# Patient Record
Sex: FEMALE | Race: WHITE | NOT HISPANIC OR LATINO | URBAN - METROPOLITAN AREA
[De-identification: names, ages, dates, MRNs, and addresses within clinical notes are randomized per-mention and may not be internally consistent; named-entity substitution may affect disease eponyms.]

---

## 2017-08-08 ENCOUNTER — OFFICE VISIT - RIVER FALLS (OUTPATIENT)
Dept: FAMILY MEDICINE | Facility: CLINIC | Age: 57
End: 2017-08-08

## 2017-08-08 ASSESSMENT — MIFFLIN-ST. JEOR: SCORE: 1166.23

## 2017-08-11 ENCOUNTER — OFFICE VISIT - RIVER FALLS (OUTPATIENT)
Dept: FAMILY MEDICINE | Facility: CLINIC | Age: 57
End: 2017-08-11

## 2022-02-12 VITALS
DIASTOLIC BLOOD PRESSURE: 84 MMHG | HEIGHT: 63 IN | SYSTOLIC BLOOD PRESSURE: 148 MMHG | SYSTOLIC BLOOD PRESSURE: 150 MMHG | HEART RATE: 96 BPM | WEIGHT: 140 LBS | BODY MASS INDEX: 25.2 KG/M2 | DIASTOLIC BLOOD PRESSURE: 82 MMHG | BODY MASS INDEX: 24.8 KG/M2 | HEIGHT: 63 IN | HEART RATE: 102 BPM | TEMPERATURE: 99.2 F

## 2022-02-15 NOTE — PROGRESS NOTES
Patient:   REBEKA FLOWER            MRN: [id number]            FIN: [id number]               Age:   57 years     Sex:  Female     :  1960   Associated Diagnoses:   Postmenopausal bleeding   Author:   Justin Eaton MD      Visit Information      Date of Service: 2017 09:13 am  Performing Location: [facility]  Encounter#: [id number]      Primary Care Provider (PCP):  Consuelo Pink MD    NPI# [id number]      Referring Provider:  Justin Eaton MD    NPI# [id number]      Chief Complaint   2017 9:23 AM CDT    U/S per Merlissa- PMB 2 weeks ago      Interval History   The patient is a 57-year-old postmenopausal female in today for vaginal probe ultrasound.  She is referred by CHICO Baptiste.  The patient was in concerned about a bleeding episode and was due for a Pap smear and mentioned that she bled two weeks prior to her visit.  The patient has not bled since that bleeding episode a few weeks ago.  She did have some cramps at about the time she bled.  Otherwise the cramps have resolved and she is asymptomatic.  Her menopause is uncomplicated.  She has not had a history of abnormal menses that she reports.      Review of Systems   Review  of systems is negative except as documented under interval history.      Health Status   Allergies:    Allergic Reactions (Selected)  No known allergies   Medications:  (Selected)   Documented Medications  Documented  Fish Oil: po, 0 Refill(s), Type: Maintenance  turmeric: 0 Refill(s), Type: Maintenance   Problem list:    All Problems  Tobacco user / ICD-9-.1 / Probable  Tobacco abuse / ICD-9-.1 / Confirmed  Had a seizure 10 years ago and normal EEG / Confirmed  SVT (supraventricular tachycardia) / SNOMED CT [id number] / Confirmed  Inactive: Seizure disorder / SNOMED CT 746866311  Inactive: Bronchial pneumonia / SNOMED CT 3184877528  Inactive: CTS (carpal tunnel syndrome) / SNOMED CT 25825757  Resolved:  Chicken pox / SNOMED CT 23302701  Resolved: Pregnancy / SNOMED CT 962528644  Resolved: Pregnancy / SNOMED CT 248119179  Resolved: Pregnancy / SNOMED CT 582401691  Resolved: Pregnancy / SNOMED CT 926790716      Histories   Past Medical History:    Active  Tobacco abuse (305.1)  Had a seizure 10 years ago and normal EEG  SVT (supraventricular tachycardia) ([id number])  Resolved  Chicken pox (57437188):  Resolved.  Pregnancy (590823242):  Resolved on 1980 at 19 years.  Pregnancy (013951614):  Resolved on 1981 at 21 years.  Pregnancy (275526309):  Resolved on 10/29/1982 at 22 years.  Pregnancy (731646289):  Resolved in  at 23 years.   Family History:    Diabetes mellitus  Father  CA - Lung cancer  Mother ()  Stroke  Grandfather (P)  Grandmother (P)  CA - Cancer of prostate  Father     Procedure history:    Colonoscopy (588172586) on 10/29/2008 at 48 Years.  Comments:  2013 12:29 PM - Violette Rogel  Findings: Both polyps were hyperplastic. Recommended repeat colonoscopy in 2018.    2013 12:19 PM - Violette Rogel  With polypectomy.  Corbin Andrade MD, [facility].  Tubal ligation (223952438) in  at 27 Years.  Excision of ganglion (205946895) on 1986 at 25 Years.  Comments:  2013 12:55 PM - Violette Rogel  Right dorsal wrist  Childbirth (2349858488) on 1984 at 24 Years.  Comments:  2013 1:07 PM - Violette Rogel    Childbirth (8214247638) on 10/29/1982 at 22 Years.  Comments:  2013 1:07 PM - Violette Rogel  IUD - Removal of intrauterine device (469601015) in  at 22 Years.  Comments:  2013 12:42 PM - Violette Rogel  Lippes IUD  Childbirth ([id number]) on 1981 at 21 Years.  Comments:  2013 1:06 PM - Violette Rogel    Laparotomy ([id number]) in the month of 1980 at 20 Years.  Comments:  2013 12:50 PM - Violette Rogel  Exploratory, removal of IUD.  Dr. Levy.  Childbirth (6896350383) on 1980 at 19  Years.  Comments:  2013 1:06 PM - Violette Rogel    Osteotomy (657176041) on 1977 at 16 Years.  Comments:  2013 12:36 PM - Violette Rogel  Bilateral Kelikian/ first metatarsal. Dr. Cabrera.  Bunionectomy ([id number]) in  at 16 Years.  Comments:  2013 12:23 PM - Violette Rogel  Bilateral   Social History:        Alcohol Assessment            Current, Beer (12 oz), 1-3 times/week, 3 drinks/episode average.  5 drinks/episode maximum.      Tobacco Assessment            Current every day smoker, 15 per day.  20 year(s).      Substance Abuse Assessment            Never      Employment and Education Assessment            Employed, Work/School description: [employer].      Home and Environment Assessment            Marital status: .  Spouse/Partner name: Ed Frame.  Risks in environment: Unlocked guns, Does not wear               helmet, Smoke/CO detectors absent.      Nutrition and Health Assessment            Type of diet: Regular.      Exercise and Physical Activity Assessment            Exercise frequency: Never.      Sexual Assessment            Sexually active: Yes.  Sexual orientation: Heterosexual.        Physical Examination   Vital Signs   2017 9:23 AM CDT Temperature Tympanic 99.2 DegF    Peripheral Pulse Rate 102 bpm  HI    Pulse Site Radial artery    HR Method Manual    Systolic Blood Pressure 148 mmHg  HI    Diastolic Blood Pressure 84 mmHg    Mean Arterial Pressure 105 mmHg    BP Site Right arm    BP Method Manual      Gynecologic:  Ultrasound is done..       Review / Management   Radiology results   Ultrasound, Complete pelvis vaginal probe ultrasound is done.  Vaginal probe ultrasound finds the uterus 6 x 4 x 3 cm.  The endometrium is very thin with no endometrial abnormalities.  Myometrium is generally normal.  On the left side there is an area of slight increased echolucency with some sharper echoes in the middle consistent with a small fibroid 8 mm across adjacent to the  endometrium.  It is very benign in appearance.  No other abnormalities are noted.  There is no fluid in the cul-de-sac.  The right ovary is 14 mm and normal in appearance.  The left ovary could not be seen with certainty.  There are no other masses or abnormalities noted in the pelvis.      Impression and Plan   Diagnosis     Postmenopausal bleeding ([id number]-FV N95.0).     No evidence of endometrial pathology.    Tiny fibroid possibly degenerating as etiology for bleeding but no evidence of ongoing pathology..     Plan:  The patient was reassured.  She will let us know if she has continuing symptoms..    Patient Instructions:       Counseled: Patient, Regarding diagnosis, Regarding treatment, Verbalized understanding.

## 2022-02-15 NOTE — PROCEDURES
Accession Number:       [id number]  CLINICAL INFORMATION::     None given  LMP::     POSTMENOPAUSAL  PREV. PAP::     2013  PREV. BX::     NONE GIVEN  SOURCE::     Cervix  STATEMENT OF ADEQUACY::     Satisfactory for evaluation. Endocervical/transformation zone component absent.  INTERPRETATION/RESULT::     Negative for intraepithelial lesion or malignancy.  COMMENT::     This Pap test has been evaluated with computer assisted technology.  CYTOTECHNOLOGIST::     BARRIE ARIAS(ASCP) CT Screening location: [place] [address]

## 2022-02-15 NOTE — PROGRESS NOTES
Patient:   REBEKA FLOWER            MRN: [id number]            FIN: [id number]               Age:   57 years     Sex:  Female     :  1960   Associated Diagnoses:   Annual physical exam; Need for TD vaccine   Author:   Kusum Dobson      Visit Information      Date of Service: 2017 02:25 pm  Performing Location: [facility]  Encounter#: [id number]      Chief Complaint   2017 2:28 PM CDT     Patient is here for annual exam.  Had 1 episode of  vaginal bleeding 2 weeks ago.      Well Adult History   Chief complaint reviewed and confirmed with patient.    The patient is here today for routine health maintenance visit.  Pap smear is not current.  Mammograph is not up to date.  She is a smoker and interested in quitting.  Colonoscopy is current.  Glucose and cholesterol screening is not current.  Patient will return fasting for lab work.  Tetanus is due and will receive today.  Her only concern today is she had an episode of bright red bleeding two weeks ago that has not continued.  This has never occurred before.  She is postmenopausal by six plus years.           Review of Systems   Constitutional:  Negative.    Eye:  Negative.    Ear/Nose/Mouth/Throat:  Negative.    Respiratory:  Negative.    Cardiovascular:  Negative.    Breast:  Negative.    Gastrointestinal:  Negative.    Genitourinary:  Negative.    Gynecologic:  Negative except as documented in history of present illness.    Hematology/Lymphatics:  Negative.    Endocrine:  Negative.    Immunologic:  Negative.    Musculoskeletal:  Negative.    Integumentary:  Negative.    Neurologic:  Negative.    Psychiatric:  Negative.    All other systems reviewed and negative      Health Status   Allergies:    Allergic Reactions (Selected)  No known allergies   Medications:  (Selected)   Documented Medications  Documented  Fish Oil: po, 0 Refill(s), Type: Maintenance  turmeric: 0 Refill(s), Type: Maintenance   Problem list:    All Problems  (Selected)  Had a seizure 10 years ago and normal EEG / Confirmed  SVT (supraventricular tachycardia) / SNOMED CT [id number] / Confirmed  Tobacco abuse / ICD-9-.1 / Confirmed  Tobacco user / ICD-9-.1 / Probable      Histories   Past Medical History:    Active  Tobacco abuse (305.1)  Had a seizure 10 years ago and normal EEG  SVT (supraventricular tachycardia) ([id number])  Resolved  Chicken Pox (052.9):  Resolved.  Pregnancy (639301386):  Resolved on 1980 at 19 years.  Pregnancy (845684647):  Resolved on 1981 at 21 years.  Pregnancy (586695606):  Resolved on 10/29/1982 at 22 years.  Pregnancy (441157394):  Resolved in  at 23 years.   Family History:    Diabetes mellitus  Father  CA - Lung cancer  Mother ()  Stroke  Grandfather (P)  Grandmother (P)  CA - Cancer of prostate  Father     Procedure history:    Colonoscopy (215452340) on 10/29/2008 at 48 Years.  Comments:  2013 12:29 PM - Violette Rogel  Findings: Both polyps were hyperplastic. Recommended repeat colonoscopy in 2018.    2013 12:19 PM - Violette Rogel  With polypectomy.  Corbin Andrade MD, [facility].  Tubal ligation (373679192) in  at 27 Years.  Excision of ganglion (241907422) on 1986 at 25 Years.  Comments:  2013 12:55 PM - Violette Rogel  Right dorsal wrist  Childbirth (9495408499) on 1984 at 24 Years.  Comments:  2013 1:07 PM - Violette Rogel    Childbirth (2434657440) on 10/29/1982 at 22 Years.  Comments:  2013 1:07 PM - Violette RogelVD  IUD - Removal of intrauterine device (414442480) in  at 22 Years.  Comments:  2013 12:42 PM - Violette Rogel  Lippes IUD  Childbirth (1472744307) on 1981 at 21 Years.  Comments:  2013 1:06 PM - Violette Rogel    Laparotomy ([id number]) in the month of 1980 at 20 Years.  Comments:  2013 12:50 PM - Violette Rogel  Exploratory, removal of IUD.  Dr. Levy.  Childbirth  (3519192301) on 1980 at 19 Years.  Comments:  2013 1:06 PM - kerwinteetee Violette    Osteotomy ([id number]) on 1977 at 16 Years.  Comments:  2013 12:36 PM - Pebbles  Violette  Bilateral Kelikian/ first metatarsal. Dr. Cabrera.  Bunionectomy ([id number]) in  at 16 Years.  Comments:  2013 12:23 PM - Pebbles  Violette  Bilateral      Physical Examination   Vital Signs   2017 2:28 PM CDT Peripheral Pulse Rate 96 bpm    HR Method Electronic    Systolic Blood Pressure 150 mmHg  HI    Diastolic Blood Pressure 82 mmHg    Mean Arterial Pressure 105 mmHg    BP Site Right arm    BP Method Electronic      Measurements from flowsheet : Measurements   2017 2:28 PM CDT Height Measured - Standard 62.5 in    Weight Measured - Standard 140 lb    BSA 1.67 m2    Body Mass Index 25.2 kg/m2    Ht/Wt Measurement Refused by Patient? No      General:  Alert and oriented, No acute distress.    Eye:  Pupils are equal, round and reactive to light, Normal conjunctiva.    HENT:  Normocephalic, Oral mucosa is moist.    Neck:  Supple, Non-tender, No lymphadenopathy, No thyromegaly.    Respiratory:  Lungs are clear to auscultation, Breath sounds are equal.    Cardiovascular:  Normal rate, Regular rhythm, No murmur.    Breast:  No mass, No tenderness, No discharge.    Gastrointestinal:  Soft, Non-tender, Non-distended, No organomegaly.    Genitourinary:  No costovertebral angle tenderness, Normal genitalia for age and sex, No inguinal tenderness, No urethral discharge, No lesions.         Vagina: Discharge ( Clear ).         Mons pubis: WNL.         Ovaries: Bilateral, Within normal limits.         Adnexa: Bilateral, Within normal limits.         Cervix: Mucosa ( Within normal limits ), Os ( WNL ).         Uterus: Within normal limits.    Lymphatics:  No lymphadenopathy neck, axilla, groin.    Integumentary:  Warm, Dry, Pink, No rash.    Neurologic:  Alert, Oriented.    Psychiatric:  Cooperative, Appropriate mood & affect.        Impression and Plan   Diagnosis     Annual physical exam (PXH88-LR Z00.00).     Need for TD vaccine ([id number]-ZY Z23).     Plan:  1.  Schedule ultrasound and consult with Dr. Eaton.  Did discuss he may do a biopsy.     2.  Schedule mammography.    3.  Await Pap smear.    4.  Smoking cessation discussed at length.  Discussed using Nicorette gum.  Discussed the rationale behind oral smoking tool such Nicorette which has nicotine.  Also discussed other techniques as well.     5.  Return fasting for lab work.    6.  Return to clinic in one year for routine health maintenance and follow up with Dr. Eaton as scheduled for this coming Friday.    Patient Instructions:       Counseled: Patient, Regarding diagnosis, Verbalized understanding.